# Patient Record
Sex: FEMALE | URBAN - METROPOLITAN AREA
[De-identification: names, ages, dates, MRNs, and addresses within clinical notes are randomized per-mention and may not be internally consistent; named-entity substitution may affect disease eponyms.]

---

## 2023-12-12 ENCOUNTER — OFFICE VISIT (OUTPATIENT)
Dept: URBAN - METROPOLITAN AREA CLINIC 70 | Facility: CLINIC | Age: 58
End: 2023-12-12

## 2023-12-22 ENCOUNTER — OFFICE VISIT (OUTPATIENT)
Dept: URBAN - METROPOLITAN AREA CLINIC 70 | Facility: CLINIC | Age: 58
End: 2023-12-22
Payer: SELF-PAY

## 2023-12-22 ENCOUNTER — LAB OUTSIDE AN ENCOUNTER (OUTPATIENT)
Dept: URBAN - METROPOLITAN AREA CLINIC 70 | Facility: CLINIC | Age: 58
End: 2023-12-22

## 2023-12-22 VITALS
HEIGHT: 68 IN | BODY MASS INDEX: 31.74 KG/M2 | DIASTOLIC BLOOD PRESSURE: 68 MMHG | SYSTOLIC BLOOD PRESSURE: 111 MMHG | WEIGHT: 209.4 LBS | HEART RATE: 85 BPM | TEMPERATURE: 97.7 F

## 2023-12-22 DIAGNOSIS — R18.8 OTHER ASCITES: ICD-10-CM

## 2023-12-22 DIAGNOSIS — K76.6 PORTAL HYPERTENSION: ICD-10-CM

## 2023-12-22 DIAGNOSIS — K74.60 UNSPECIFIED CIRRHOSIS OF LIVER: ICD-10-CM

## 2023-12-22 PROBLEM — 389026000: Status: ACTIVE | Noted: 2023-12-22

## 2023-12-22 PROBLEM — 19943007: Status: ACTIVE | Noted: 2023-12-22

## 2023-12-22 PROBLEM — 34742003: Status: ACTIVE | Noted: 2023-12-22

## 2023-12-22 PROCEDURE — 99204 OFFICE O/P NEW MOD 45 MIN: CPT | Performed by: REGISTERED NURSE

## 2023-12-22 NOTE — HPI-TODAY'S VISIT:
Pt is a [facility] inmate. She accompanied by a . She presents for cirrhosis. They were diagnosed with cirrhosis after recent labs showed elevated LFTs. Labs not available at the time of her visit. CT scan on 11/7/23 showed a small amount of ascites adjacent to the liver, cirrhotic appearance of the liver, and evidence of portal hypertension. She denies any heavy alcohol use currently or in the past. No known hx of viral hepatitis. She does report a recent episode of jaundice that lasted for about a week.

## 2024-02-05 ENCOUNTER — EGD (OUTPATIENT)
Dept: URBAN - METROPOLITAN AREA MEDICAL CENTER 42 | Facility: MEDICAL CENTER | Age: 59
End: 2024-02-05
Payer: SELF-PAY

## 2024-02-05 DIAGNOSIS — K74.69 CIRRHOSIS, CRYPTOGENIC: ICD-10-CM

## 2024-02-05 PROCEDURE — 43235 EGD DIAGNOSTIC BRUSH WASH: CPT | Performed by: INTERNAL MEDICINE

## 2024-04-02 ENCOUNTER — OV EP (OUTPATIENT)
Dept: URBAN - METROPOLITAN AREA CLINIC 70 | Facility: CLINIC | Age: 59
End: 2024-04-02

## 2024-04-16 ENCOUNTER — LAB (OUTPATIENT)
Dept: URBAN - METROPOLITAN AREA CLINIC 70 | Facility: CLINIC | Age: 59
End: 2024-04-16

## 2024-04-16 ENCOUNTER — OV EP (OUTPATIENT)
Dept: URBAN - METROPOLITAN AREA CLINIC 70 | Facility: CLINIC | Age: 59
End: 2024-04-16

## 2024-04-16 VITALS
HEART RATE: 92 BPM | SYSTOLIC BLOOD PRESSURE: 110 MMHG | TEMPERATURE: 97.9 F | WEIGHT: 234 LBS | HEIGHT: 68 IN | DIASTOLIC BLOOD PRESSURE: 67 MMHG | BODY MASS INDEX: 35.46 KG/M2

## 2024-04-16 RX ORDER — RIFAXIMIN 550 MG/1
2 TABLETS TABLET ORAL THREE TIMES A DAY
Status: ACTIVE | COMMUNITY

## 2024-04-16 RX ORDER — LACTULOSE 10 G/15ML
15 ML AS NEEDED SOLUTION ORAL ONCE A DAY
Status: ACTIVE | COMMUNITY

## 2024-04-16 RX ORDER — FUROSEMIDE 40 MG/1
1 TABLET TABLET ORAL ONCE A DAY
OUTPATIENT

## 2024-04-16 RX ORDER — SPIRONOLACTONE 100 MG/1
1 TABLET TABLET, FILM COATED ORAL ONCE A DAY
Qty: 30 | OUTPATIENT
Start: 2024-04-16 | End: 2024-05-16

## 2024-04-16 RX ORDER — LACTULOSE 10 G/15ML
30ML SOLUTION ORAL
Qty: 1800 ML | Refills: 11 | OUTPATIENT

## 2024-04-16 RX ORDER — POTASSIUM CHLORIDE 1500 MG/1
1 TABLET WITH FOOD TABLET, EXTENDED RELEASE ORAL ONCE A DAY
Status: ACTIVE | COMMUNITY

## 2024-04-16 NOTE — HPI-TODAY'S VISIT:
Note from OV 12/22/23: Pt is a retirement inmate. She accompanied by a . She presents for cirrhosis. They were diagnosed with cirrhosis after recent labs showed elevated LFTs. Labs not available at the time of her visit. CT scan on 11/7/23 showed a small amount of ascites adjacent to the liver, cirrhotic appearance of the liver, and evidence of portal hypertension. She denies any heavy alcohol use currently or in the past. No known hx of viral hepatitis. She does report a recent episode of jaundice that lasted for about a week. ----------------------------------------------

## 2024-05-09 ENCOUNTER — DASHBOARD ENCOUNTERS (OUTPATIENT)
Age: 59
End: 2024-05-09

## 2024-07-16 ENCOUNTER — OFFICE VISIT (OUTPATIENT)
Dept: URBAN - METROPOLITAN AREA CLINIC 70 | Facility: CLINIC | Age: 59
End: 2024-07-16

## 2024-07-16 RX ORDER — POTASSIUM CHLORIDE 1500 MG/1
1 TABLET WITH FOOD TABLET, EXTENDED RELEASE ORAL ONCE A DAY
Status: ACTIVE | COMMUNITY

## 2024-07-16 RX ORDER — FUROSEMIDE 40 MG/1
1 TABLET TABLET ORAL ONCE A DAY
Status: ACTIVE | COMMUNITY

## 2024-07-16 RX ORDER — RIFAXIMIN 550 MG/1
2 TABLETS TABLET ORAL THREE TIMES A DAY
Status: ACTIVE | COMMUNITY

## 2024-07-16 RX ORDER — LACTULOSE 10 G/15ML
30ML SOLUTION ORAL
Qty: 1800 ML | Refills: 11 | Status: ACTIVE | COMMUNITY